# Patient Record
Sex: MALE | Race: WHITE
[De-identification: names, ages, dates, MRNs, and addresses within clinical notes are randomized per-mention and may not be internally consistent; named-entity substitution may affect disease eponyms.]

---

## 2019-07-05 ENCOUNTER — HOSPITAL ENCOUNTER (OUTPATIENT)
Dept: HOSPITAL 95 - LAB | Age: 69
Discharge: HOME | End: 2019-07-05
Attending: FAMILY MEDICINE
Payer: COMMERCIAL

## 2019-07-05 DIAGNOSIS — E78.2: Primary | ICD-10-CM

## 2019-07-05 LAB
CHOLEST SERPL-MCNC: 212 MG/DL (ref 50–200)
CHOLEST/HDLC SERPL: 5.9 {RATIO}
HDLC SERPL-MCNC: 36 MG/DL (ref 39–?)
LDLC SERPL CALC-MCNC: 136 MG/DL (ref 0–110)
LDLC/HDLC SERPL: 3.8 {RATIO}
TRIGL SERPL-MCNC: 202 MG/DL (ref 30–160)
VLDLC SERPL CALC-MCNC: 40 MG/DL (ref 6–32)

## 2021-09-24 ENCOUNTER — HOSPITAL ENCOUNTER (OUTPATIENT)
Dept: HOSPITAL 95 - ER | Age: 71
Setting detail: OBSERVATION
Discharge: HOME | End: 2021-09-24
Attending: HOSPITALIST | Admitting: INTERNAL MEDICINE
Payer: COMMERCIAL

## 2021-09-24 VITALS — BODY MASS INDEX: 39.37 KG/M2 | WEIGHT: 245 LBS | HEIGHT: 66 IN

## 2021-09-24 DIAGNOSIS — E78.5: ICD-10-CM

## 2021-09-24 DIAGNOSIS — I34.0: ICD-10-CM

## 2021-09-24 DIAGNOSIS — G45.9: Primary | ICD-10-CM

## 2021-09-24 DIAGNOSIS — E66.01: ICD-10-CM

## 2021-09-24 DIAGNOSIS — N18.30: ICD-10-CM

## 2021-09-24 DIAGNOSIS — I13.10: ICD-10-CM

## 2021-09-24 DIAGNOSIS — E11.22: ICD-10-CM

## 2021-09-24 DIAGNOSIS — F17.210: ICD-10-CM

## 2021-09-24 DIAGNOSIS — Z88.1: ICD-10-CM

## 2021-09-24 DIAGNOSIS — Z88.2: ICD-10-CM

## 2021-09-24 DIAGNOSIS — Z88.8: ICD-10-CM

## 2021-09-24 LAB
ALBUMIN SERPL BCP-MCNC: 3.5 G/DL (ref 3.4–5)
ALBUMIN/GLOB SERPL: 0.8 {RATIO} (ref 0.8–1.8)
ALT SERPL W P-5'-P-CCNC: 35 U/L (ref 12–78)
ANION GAP SERPL CALCULATED.4IONS-SCNC: 7 MMOL/L (ref 6–16)
ANION GAP SERPL CALCULATED.4IONS-SCNC: 7 MMOL/L (ref 6–16)
AST SERPL W P-5'-P-CCNC: 19 U/L (ref 12–37)
BASOPHILS # BLD AUTO: 0.05 K/MM3 (ref 0–0.23)
BASOPHILS # BLD AUTO: 0.05 K/MM3 (ref 0–0.23)
BASOPHILS NFR BLD AUTO: 1 % (ref 0–2)
BASOPHILS NFR BLD AUTO: 1 % (ref 0–2)
BILIRUB SERPL-MCNC: 0.7 MG/DL (ref 0.1–1)
BUN SERPL-MCNC: 23 MG/DL (ref 8–24)
BUN SERPL-MCNC: 24 MG/DL (ref 8–24)
CALCIUM SERPL-MCNC: 9 MG/DL (ref 8.5–10.1)
CALCIUM SERPL-MCNC: 9.2 MG/DL (ref 8.5–10.1)
CHLORIDE SERPL-SCNC: 104 MMOL/L (ref 98–108)
CHLORIDE SERPL-SCNC: 105 MMOL/L (ref 98–108)
CHOLEST SERPL-MCNC: 151 MG/DL (ref 50–200)
CHOLEST/HDLC SERPL: 3.3 {RATIO}
CO2 SERPL-SCNC: 25 MMOL/L (ref 21–32)
CO2 SERPL-SCNC: 26 MMOL/L (ref 21–32)
CREAT SERPL-MCNC: 1.32 MG/DL (ref 0.6–1.2)
CREAT SERPL-MCNC: 1.5 MG/DL (ref 0.6–1.2)
DEPRECATED RDW RBC AUTO: 44.1 FL (ref 35.1–46.3)
DEPRECATED RDW RBC AUTO: 44.6 FL (ref 35.1–46.3)
EOSINOPHIL # BLD AUTO: 0.14 K/MM3 (ref 0–0.68)
EOSINOPHIL # BLD AUTO: 0.16 K/MM3 (ref 0–0.68)
EOSINOPHIL NFR BLD AUTO: 2 % (ref 0–6)
EOSINOPHIL NFR BLD AUTO: 2 % (ref 0–6)
ERYTHROCYTE [DISTWIDTH] IN BLOOD BY AUTOMATED COUNT: 12.9 % (ref 11.7–14.2)
ERYTHROCYTE [DISTWIDTH] IN BLOOD BY AUTOMATED COUNT: 12.9 % (ref 11.7–14.2)
GLOBULIN SER CALC-MCNC: 4.4 G/DL (ref 2.2–4)
GLUCOSE SERPL-MCNC: 145 MG/DL (ref 70–99)
GLUCOSE SERPL-MCNC: 155 MG/DL (ref 70–99)
HCT VFR BLD AUTO: 42.6 % (ref 37–53)
HCT VFR BLD AUTO: 47.3 % (ref 37–53)
HDLC SERPL-MCNC: 46 MG/DL (ref 39–?)
HGB BLD-MCNC: 14.2 G/DL (ref 13.5–17.5)
HGB BLD-MCNC: 15.7 G/DL (ref 13.5–17.5)
IMM GRANULOCYTES # BLD AUTO: 0.02 K/MM3 (ref 0–0.1)
IMM GRANULOCYTES # BLD AUTO: 0.03 K/MM3 (ref 0–0.1)
IMM GRANULOCYTES NFR BLD AUTO: 0 % (ref 0–1)
IMM GRANULOCYTES NFR BLD AUTO: 0 % (ref 0–1)
LDLC SERPL CALC-MCNC: 74 MG/DL (ref 0–110)
LDLC/HDLC SERPL: 1.6 {RATIO}
LYMPHOCYTES # BLD AUTO: 2.58 K/MM3 (ref 0.84–5.2)
LYMPHOCYTES # BLD AUTO: 2.74 K/MM3 (ref 0.84–5.2)
LYMPHOCYTES NFR BLD AUTO: 28 % (ref 21–46)
LYMPHOCYTES NFR BLD AUTO: 32 % (ref 21–46)
MCHC RBC AUTO-ENTMCNC: 33.2 G/DL (ref 31.5–36.5)
MCHC RBC AUTO-ENTMCNC: 33.3 G/DL (ref 31.5–36.5)
MCV RBC AUTO: 93 FL (ref 80–100)
MCV RBC AUTO: 93 FL (ref 80–100)
MONOCYTES # BLD AUTO: 0.7 K/MM3 (ref 0.16–1.47)
MONOCYTES # BLD AUTO: 0.85 K/MM3 (ref 0.16–1.47)
MONOCYTES NFR BLD AUTO: 8 % (ref 4–13)
MONOCYTES NFR BLD AUTO: 9 % (ref 4–13)
NEUTROPHILS # BLD AUTO: 4.8 K/MM3 (ref 1.96–9.15)
NEUTROPHILS # BLD AUTO: 5.55 K/MM3 (ref 1.96–9.15)
NEUTROPHILS NFR BLD AUTO: 57 % (ref 41–73)
NEUTROPHILS NFR BLD AUTO: 61 % (ref 41–73)
NRBC # BLD AUTO: 0 K/MM3 (ref 0–0.02)
NRBC # BLD AUTO: 0 K/MM3 (ref 0–0.02)
NRBC BLD AUTO-RTO: 0 /100 WBC (ref 0–0.2)
NRBC BLD AUTO-RTO: 0 /100 WBC (ref 0–0.2)
PLATELET # BLD AUTO: 216 K/MM3 (ref 150–400)
PLATELET # BLD AUTO: 238 K/MM3 (ref 150–400)
POTASSIUM SERPL-SCNC: 3.5 MMOL/L (ref 3.5–5.5)
POTASSIUM SERPL-SCNC: 3.9 MMOL/L (ref 3.5–5.5)
PROT SERPL-MCNC: 7.9 G/DL (ref 6.4–8.2)
PROTHROMBIN TIME: 10.9 SEC (ref 9.7–11.5)
SODIUM SERPL-SCNC: 137 MMOL/L (ref 136–145)
SODIUM SERPL-SCNC: 137 MMOL/L (ref 136–145)
TRIGL SERPL-MCNC: 156 MG/DL (ref 30–160)
TSH SERPL DL<=0.005 MIU/L-ACNC: 1.68 UIU/ML (ref 0.36–4.8)
VLDLC SERPL CALC-MCNC: 31 MG/DL (ref 6–32)

## 2021-09-24 PROCEDURE — A9270 NON-COVERED ITEM OR SERVICE: HCPCS

## 2021-09-24 NOTE — NUR
PATIENT DISCHARGED TO HOME AT THIS TIME AS ORDERED PATIENT STABLE A&OX4 DENIES
PAIN OR DISCOMFORT SENSATION AND FUNCTION TO RUE HAS RETURNED TO BASELINE PER
PATIENT UP AD ANDREW WITH STEADY GAIT INDEPENDENT WITH ADL'S ALL DISCHARGE
INSTUCTIONS/PAPERWORK PROVIDED INCLUDING MEDICATIONS FOLLOW UP AND DISEASE
PROCESS MANAGEMENT PATIENT VERBALIZES GOOD UNDERSTANDING OF ALL DISCHARE
INSTRUCTIONS PATIENT TRANSPORTED OFF OF UNIT IN [facility] BY [facility]
MEMBER TO TRANSPORT HOME IN PERSONAL VEHICLE PATIENT WITH NO COMPLAINTS NO
CONCERNS NO DISTRESS NOTED AT TIME OF DISCHARGE PATIENT LEFT WILL ALL PERSONAL
BELONGINGS

## 2021-09-24 NOTE — NUR
END OF SHIFT SUMMARY:
Pt still experiencing some numbness in the Right lower arm. states that the
feeling is coming back to where he can feel his phone in his hands, but there
is still some numbess. did not notice any differences in  strength of both
hands. Pt states that his speech is "pretty much back to normal"  A&Ox4.
Independent to the bathroom. call light withing reach. Bed in lowest position.

## 2021-10-09 ENCOUNTER — HOSPITAL ENCOUNTER (EMERGENCY)
Dept: HOSPITAL 95 - ER | Age: 71
Discharge: HOME | End: 2021-10-09
Payer: COMMERCIAL

## 2021-10-09 VITALS — WEIGHT: 240 LBS | BODY MASS INDEX: 39.99 KG/M2 | HEIGHT: 65 IN

## 2021-10-09 DIAGNOSIS — F17.200: ICD-10-CM

## 2021-10-09 DIAGNOSIS — E11.22: ICD-10-CM

## 2021-10-09 DIAGNOSIS — Z79.82: ICD-10-CM

## 2021-10-09 DIAGNOSIS — Z23: ICD-10-CM

## 2021-10-09 DIAGNOSIS — S61.215A: Primary | ICD-10-CM
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
## 2021-10-09 DIAGNOSIS — Z88.1: ICD-10-CM

## 2021-10-09 DIAGNOSIS — N18.30: ICD-10-CM

## 2021-10-09 DIAGNOSIS — Z88.8: ICD-10-CM

## 2021-10-09 DIAGNOSIS — Z88.2: ICD-10-CM

## 2021-10-09 DIAGNOSIS — S80.11XA: ICD-10-CM

## 2021-10-09 DIAGNOSIS — E78.5: ICD-10-CM

## 2021-10-09 DIAGNOSIS — Z79.84: ICD-10-CM

## 2021-10-09 DIAGNOSIS — Z79.899: ICD-10-CM

## 2021-10-09 DIAGNOSIS — Z79.02: ICD-10-CM

## 2021-10-09 DIAGNOSIS — I12.9: ICD-10-CM

## 2022-10-10 ENCOUNTER — HOSPITAL ENCOUNTER (EMERGENCY)
Dept: HOSPITAL 95 - ER | Age: 72
Discharge: HOME | End: 2022-10-10
Payer: COMMERCIAL

## 2022-10-10 VITALS — HEIGHT: 65 IN | WEIGHT: 245 LBS | BODY MASS INDEX: 40.82 KG/M2

## 2022-10-10 DIAGNOSIS — E11.22: ICD-10-CM

## 2022-10-10 DIAGNOSIS — Z88.8: ICD-10-CM

## 2022-10-10 DIAGNOSIS — N18.30: ICD-10-CM

## 2022-10-10 DIAGNOSIS — Z88.1: ICD-10-CM

## 2022-10-10 DIAGNOSIS — Z87.891: ICD-10-CM

## 2022-10-10 DIAGNOSIS — Z88.2: ICD-10-CM

## 2022-10-10 DIAGNOSIS — N20.0: Primary | ICD-10-CM

## 2022-10-10 DIAGNOSIS — I12.9: ICD-10-CM

## 2022-10-10 DIAGNOSIS — E66.01: ICD-10-CM

## 2022-10-10 DIAGNOSIS — Z86.73: ICD-10-CM

## 2022-10-10 DIAGNOSIS — Z79.82: ICD-10-CM

## 2022-10-10 DIAGNOSIS — Z79.899: ICD-10-CM

## 2022-10-10 LAB
ALBUMIN SERPL BCP-MCNC: 4 G/DL (ref 3.4–5)
ALBUMIN/GLOB SERPL: 0.9 {RATIO} (ref 0.8–1.8)
ALT SERPL W P-5'-P-CCNC: 32 U/L (ref 12–78)
ANION GAP SERPL CALCULATED.4IONS-SCNC: 9 MMOL/L (ref 6–16)
AST SERPL W P-5'-P-CCNC: 17 U/L (ref 12–37)
BASOPHILS # BLD AUTO: 0.05 K/MM3 (ref 0–0.23)
BASOPHILS NFR BLD AUTO: 0 % (ref 0–2)
BILIRUB SERPL-MCNC: 0.9 MG/DL (ref 0.1–1)
BUN SERPL-MCNC: 28 MG/DL (ref 8–24)
CALCIUM SERPL-MCNC: 9.7 MG/DL (ref 8.5–10.1)
CHLORIDE SERPL-SCNC: 106 MMOL/L (ref 98–108)
CO2 SERPL-SCNC: 23 MMOL/L (ref 21–32)
CREAT SERPL-MCNC: 1.41 MG/DL (ref 0.6–1.2)
DEPRECATED RDW RBC AUTO: 45.5 FL (ref 35.1–46.3)
EOSINOPHIL # BLD AUTO: 0.02 K/MM3 (ref 0–0.68)
EOSINOPHIL NFR BLD AUTO: 0 % (ref 0–6)
ERYTHROCYTE [DISTWIDTH] IN BLOOD BY AUTOMATED COUNT: 13.4 % (ref 11.7–14.2)
GLOBULIN SER CALC-MCNC: 4.4 G/DL (ref 2.2–4)
GLUCOSE SERPL-MCNC: 233 MG/DL (ref 70–99)
GLUCOSE UR-MCNC: (no result) MG/DL
HCT VFR BLD AUTO: 47.8 % (ref 37–53)
HGB BLD-MCNC: 16.2 G/DL (ref 13.5–17.5)
IMM GRANULOCYTES # BLD AUTO: 0.06 K/MM3 (ref 0–0.1)
IMM GRANULOCYTES NFR BLD AUTO: 1 % (ref 0–1)
KETONES UR STRIP-MCNC: (no result) MG/DL
LYMPHOCYTES # BLD AUTO: 1.26 K/MM3 (ref 0.84–5.2)
LYMPHOCYTES NFR BLD AUTO: 10 % (ref 21–46)
MCHC RBC AUTO-ENTMCNC: 33.9 G/DL (ref 31.5–36.5)
MCV RBC AUTO: 92 FL (ref 80–100)
MONOCYTES # BLD AUTO: 0.71 K/MM3 (ref 0.16–1.47)
MONOCYTES NFR BLD AUTO: 5 % (ref 4–13)
NEUTROPHILS # BLD AUTO: 11.05 K/MM3 (ref 1.96–9.15)
NEUTROPHILS NFR BLD AUTO: 84 % (ref 41–73)
NRBC # BLD AUTO: 0 K/MM3 (ref 0–0.02)
NRBC BLD AUTO-RTO: 0 /100 WBC (ref 0–0.2)
PLATELET # BLD AUTO: 247 K/MM3 (ref 150–400)
POTASSIUM SERPL-SCNC: 4.2 MMOL/L (ref 3.5–5.5)
PROT SERPL-MCNC: 8.4 G/DL (ref 6.4–8.2)
PROT UR STRIP-MCNC: (no result) MG/DL
RBC #/AREA URNS HPF: (no result) /HPF (ref 0–2)
SODIUM SERPL-SCNC: 138 MMOL/L (ref 136–145)
SP GR SPEC: 1.01 (ref 1–1.02)
UROBILINOGEN UR STRIP-MCNC: (no result) MG/DL
WBC #/AREA URNS HPF: (no result) /HPF (ref 0–5)